# Patient Record
Sex: MALE | URBAN - METROPOLITAN AREA
[De-identification: names, ages, dates, MRNs, and addresses within clinical notes are randomized per-mention and may not be internally consistent; named-entity substitution may affect disease eponyms.]

---

## 2024-05-14 ENCOUNTER — TELEPHONE (OUTPATIENT)
Dept: CARDIOLOGY | Facility: CLINIC | Age: 79
End: 2024-05-14

## 2024-05-14 NOTE — TELEPHONE ENCOUNTER
5/14 - Called to schedule patient NPV w Dr. Harper. Ref by Dr. Erazo [facility]. Patient stated his recent stress test came up negative, feels fine and does not wish to proceed at this time. [facility] informed. - Sundeep JIMENEZ